# Patient Record
Sex: MALE | Race: WHITE | NOT HISPANIC OR LATINO | Employment: OTHER | URBAN - NONMETROPOLITAN AREA
[De-identification: names, ages, dates, MRNs, and addresses within clinical notes are randomized per-mention and may not be internally consistent; named-entity substitution may affect disease eponyms.]

---

## 2024-07-12 ENCOUNTER — TELEPHONE (OUTPATIENT)
Dept: CARDIOLOGY | Facility: CLINIC | Age: 64
End: 2024-07-12

## 2024-07-12 DIAGNOSIS — Z45.02 IMPLANTABLE DEFIBRILLATOR REPROGRAMMING/CHECK: Primary | ICD-10-CM

## 2024-07-12 PROCEDURE — 93306 TTE W/DOPPLER COMPLETE: CPT | Performed by: INTERNAL MEDICINE

## 2024-07-12 NOTE — TELEPHONE ENCOUNTER
Received ER phone call from [facility] on Eber for COMPLETE HEART BLOCK. Per Dr Zambrano please set patient up for in patient pacemaker implant Monday 7/15/2024 or even maybe this weekend 7/13/2024 or 7/14/2024. Routine follow up and pacemaker orders.

## 2024-07-15 ENCOUNTER — TELEPHONE (OUTPATIENT)
Dept: CARDIOLOGY | Facility: CLINIC | Age: 64
End: 2024-07-15
Payer: MEDICARE

## 2024-07-15 NOTE — TELEPHONE ENCOUNTER
Message recvd from Dr. Balaji Zambrano MD   Patient needs one week wound check, 15 week Follow up, pacemaker referral.     Orders previously entered by nurse clinician twyla 07/12/2024 telephone encounter.

## 2024-07-19 ENCOUNTER — CLINICAL SUPPORT (OUTPATIENT)
Dept: CARDIOLOGY | Facility: CLINIC | Age: 64
End: 2024-07-19
Payer: MEDICARE

## 2024-07-19 VITALS
DIASTOLIC BLOOD PRESSURE: 88 MMHG | BODY MASS INDEX: 41.62 KG/M2 | SYSTOLIC BLOOD PRESSURE: 136 MMHG | HEIGHT: 73 IN | HEART RATE: 68 BPM | WEIGHT: 314 LBS

## 2024-07-19 DIAGNOSIS — Z45.02 IMPLANTABLE DEFIBRILLATOR REPROGRAMMING/CHECK: ICD-10-CM

## 2024-07-19 DIAGNOSIS — I44.2 COMPLETE HEART BLOCK (MULTI): ICD-10-CM

## 2024-07-19 PROCEDURE — 99024 POSTOP FOLLOW-UP VISIT: CPT | Performed by: INTERNAL MEDICINE

## 2024-07-19 RX ORDER — METOPROLOL TARTRATE AND HYDROCHLOROTHIAZIDE 100; 50 MG/1; MG/1
0.5 TABLET ORAL DAILY
COMMUNITY

## 2024-07-19 RX ORDER — LOSARTAN POTASSIUM 100 MG/1
100 TABLET ORAL DAILY
COMMUNITY

## 2024-07-19 RX ORDER — FERROUS SULFATE, DRIED 160(50) MG
1 TABLET, EXTENDED RELEASE ORAL DAILY
COMMUNITY

## 2024-07-19 RX ORDER — AMLODIPINE BESYLATE 10 MG/1
10 TABLET ORAL DAILY
COMMUNITY

## 2024-07-19 RX ORDER — LANOLIN ALCOHOL/MO/W.PET/CERES
1000 CREAM (GRAM) TOPICAL DAILY
COMMUNITY

## 2024-07-19 RX ORDER — BISMUTH SUBSALICYLATE 262 MG
1 TABLET,CHEWABLE ORAL DAILY
COMMUNITY

## 2024-07-19 NOTE — PROGRESS NOTES
"Patient is here for a Wound check ordered by Dr. Zambrano status post pacemaker insertion. Dr. Jackson is in suite. Patient had first pacemaker implant inserted on 7/13 completed by Dr. Balaji Zambrano MD due to diagnosis of complete heart block. Insertion site was free of any signs of infection or drainage. Course of antibiotics completed. Medication list was Updated verbally with patient in office. Denies cardiac complaints at this time.       To Dr. Vianney Aldana MD for review.       Vitals:    07/19/24 1211   BP: 136/88   BP Location: Right arm   Patient Position: Sitting   Pulse: 68   Temp: 36.8 °C (98.2 °F)   Weight: 142 kg (314 lb)   Height: 1.854 m (6' 1\")         "

## 2024-07-22 VITALS
HEART RATE: 68 BPM | SYSTOLIC BLOOD PRESSURE: 136 MMHG | DIASTOLIC BLOOD PRESSURE: 88 MMHG | TEMPERATURE: 98.2 F | HEIGHT: 73 IN | BODY MASS INDEX: 41.62 KG/M2 | WEIGHT: 314 LBS

## 2024-11-15 ENCOUNTER — APPOINTMENT (OUTPATIENT)
Dept: CARDIOLOGY | Facility: CLINIC | Age: 64
End: 2024-11-15
Payer: MEDICARE

## 2024-11-15 VITALS
DIASTOLIC BLOOD PRESSURE: 88 MMHG | SYSTOLIC BLOOD PRESSURE: 132 MMHG | HEART RATE: 72 BPM | HEIGHT: 74 IN | BODY MASS INDEX: 40.43 KG/M2 | WEIGHT: 315 LBS

## 2024-11-15 DIAGNOSIS — I10 PRIMARY HYPERTENSION: ICD-10-CM

## 2024-11-15 DIAGNOSIS — E66.01 MORBID OBESITY (MULTI): ICD-10-CM

## 2024-11-15 DIAGNOSIS — Z95.0 PACEMAKER: ICD-10-CM

## 2024-11-15 DIAGNOSIS — R00.1 BRADYCARDIA: ICD-10-CM

## 2024-11-15 DIAGNOSIS — Z45.018 PACEMAKER REPROGRAMMING/CHECK: ICD-10-CM

## 2024-11-15 DIAGNOSIS — E78.2 MIXED HYPERLIPIDEMIA: ICD-10-CM

## 2024-11-15 DIAGNOSIS — I44.2 COMPLETE ATRIOVENTRICULAR BLOCK (MULTI): Primary | ICD-10-CM

## 2024-11-15 PROBLEM — Z95.810 AICD (AUTOMATIC CARDIOVERTER/DEFIBRILLATOR) PRESENT: Status: ACTIVE | Noted: 2024-11-15

## 2024-11-15 PROCEDURE — 99214 OFFICE O/P EST MOD 30 MIN: CPT | Performed by: INTERNAL MEDICINE

## 2024-11-15 PROCEDURE — 3008F BODY MASS INDEX DOCD: CPT | Performed by: INTERNAL MEDICINE

## 2024-11-15 PROCEDURE — 3079F DIAST BP 80-89 MM HG: CPT | Performed by: INTERNAL MEDICINE

## 2024-11-15 PROCEDURE — 1159F MED LIST DOCD IN RCRD: CPT | Performed by: INTERNAL MEDICINE

## 2024-11-15 PROCEDURE — 3075F SYST BP GE 130 - 139MM HG: CPT | Performed by: INTERNAL MEDICINE

## 2024-11-15 PROCEDURE — 93000 ELECTROCARDIOGRAM COMPLETE: CPT | Performed by: INTERNAL MEDICINE

## 2024-11-15 PROCEDURE — 1036F TOBACCO NON-USER: CPT | Performed by: INTERNAL MEDICINE

## 2024-11-15 RX ORDER — INDAPAMIDE 2.5 MG/1
2.5 TABLET ORAL EVERY MORNING
Qty: 90 TABLET | Refills: 3 | Status: SHIPPED | OUTPATIENT
Start: 2024-11-15 | End: 2025-11-15

## 2024-11-15 NOTE — LETTER
November 15, 2024     Kris Gorman,   [address] [place] [address]    Patient: Eber Herrera   YOB: 1960   Date of Visit: 11/15/2024       Dear Dr. Kris Gorman, DO:    Thank you for referring Eber Herrera to me for evaluation. Below are my notes for this consultation.  If you have questions, please do not hesitate to call me. I look forward to following your patient along with you.       Sincerely,     Vianney Aldana MD      CC: No Recipients  ______________________________________________________________________________________    Subjective   Eber Herrera is a 64 y.o. male            HPI   64-year-old white male who this past July presented to [facility] with complete heart block and was seen by Dr. Zambrano.  He had permanent AV sequential pacemaker with no complications.  He has not follow-up yet with the pacemaker clinic.  His other medical problems include only hypertension and morbid obesity.  He has no history of underlying coronary heart disease or heart failure.  Has no valvular heart disease.  There is no family history of heart block requiring pacemakers.  He lives with his wife and maintains relatively active lifestyle and his snf.  He has been compliant with medical therapy.  EKG today revealed he is 100% pacing the right ventricle.  He is currently on beta-blocker as well.  His pressure is under control, his cardiac and pulmonary examinations were normal.    Assessment/recommendations:    1-complete heart block status post AV sequential pacemaker implantation July 2024 at [facility] by Dr. Zambrano.  EKG today demonstrated AV sequential pacing.  The patient will be scheduled follow-up in the pacemaker clinic.  I advised patient to avoid beta-blocker therapy see if he become somewhat independent of the pacemaker at least partially.  2-essential hypertension, currently  "under control, will try to avoid beta-blocker therapy and allow more independence from the pacemaker.  He will be starting indapamide 2.5 mg daily to replace the combination of metoprolol and hydrochlorothiazide.  This metabolic profile in couple week was ordered  3-morbid obesity, lifestyle modification to lose weight was advocated.  Review of Systems   All other systems reviewed and are negative.           Vitals:    11/15/24 1459   BP: 132/88   BP Location: Right arm   Patient Position: Sitting   Pulse: 72   Weight: 144 kg (318 lb)   Height: 1.88 m (6' 2\")      EKG done in office today   Objective   Physical Exam  Constitutional:       Appearance: Normal appearance.   HENT:      Nose: Nose normal.   Neck:      Vascular: No carotid bruit.   Cardiovascular:      Rate and Rhythm: Normal rate.      Pulses: Normal pulses.      Heart sounds: Normal heart sounds.   Pulmonary:      Effort: Pulmonary effort is normal.   Abdominal:      General: Bowel sounds are normal.      Palpations: Abdomen is soft.   Musculoskeletal:         General: Normal range of motion.      Cervical back: Normal range of motion.      Right lower leg: No edema.      Left lower leg: No edema.   Skin:     General: Skin is warm and dry.   Neurological:      General: No focal deficit present.      Mental Status: He is alert.   Psychiatric:         Mood and Affect: Mood normal.         Behavior: Behavior normal.         Thought Content: Thought content normal.         Judgment: Judgment normal.         Allergies  Prednisone     Current Medications    Current Outpatient Medications:   •  amLODIPine (Norvasc) 10 mg tablet, Take 1 tablet (10 mg) by mouth once daily., Disp: , Rfl:   •  calcium carbonate-vitamin D3 500 mg-5 mcg (200 unit) tablet, Take 1 tablet by mouth once daily., Disp: , Rfl:   •  cyanocobalamin (Vitamin B-12) 1,000 mcg tablet, Take 1 tablet (1,000 mcg) by mouth once daily., Disp: , Rfl:   •  losartan (Cozaar) 100 mg tablet, Take 1 tablet " (100 mg) by mouth once daily., Disp: , Rfl:   •  metoprolol ta-hydrochlorothiaz (Lopressor HCT) 100-50 mg tablet, Take 0.5 tablets by mouth once daily., Disp: , Rfl:   •  multivitamin tablet, Take 1 tablet by mouth once daily., Disp: , Rfl:                      Assessment/Plan   1. Implantable defibrillator reprogramming/check  Follow Up In Cardiology      2. Complete atrioventricular block (Multi)        3. Bradycardia        4. Pacemaker        5. Primary hypertension        6. Mixed hyperlipidemia        7. BMI 40.0-44.9, adult (Multi)                 Scribe Attestation  By signing my name below, I, Nhi PATEL LPN Scribe   attest that this documentation has been prepared under the direction and in the presence of Vianney Aldana MD.     Provider Attestation - Scribe documentation    All medical record entries made by the Scribe were at my direction and personally dictated by me. I have reviewed the chart and agree that the record accurately reflects my personal performance of the history, physical exam, discussion and plan.

## 2024-11-15 NOTE — PATIENT INSTRUCTIONS
Please bring all medicines, vitamins, and herbal supplements with you when you come to the office.    Prescriptions will not be filled unless you are compliant with your follow up appointments or have a follow up appointment scheduled as per instruction of your physician. Refills should be requested at the time of your visit.     Pacemaker/Defibrillator follow up per routine     BMI was above normal measurement. Current weight: 144 kg (318 lb)  Weight change since last visit (-) denotes wt loss 4 lbs   Weight loss needed to achieve BMI 25: 123.7 Lbs  Weight loss needed to achieve BMI 30: 84.8 Lbs  Provided instructions on dietary changes  Provided instructions on exercise.

## 2024-11-15 NOTE — PROGRESS NOTES
"Subjective   Eber Herrera is a 64 y.o. male            HPI   64-year-old white male who this past July presented to [facility] with complete heart block and was seen by Dr. Zambrano.  He had permanent AV sequential pacemaker with no complications.  He has not follow-up yet with the pacemaker clinic.  His other medical problems include only hypertension and morbid obesity.  He has no history of underlying coronary heart disease or heart failure.  Has no valvular heart disease.  There is no family history of heart block requiring pacemakers.  He lives with his wife and maintains relatively active lifestyle and his senior living.  He has been compliant with medical therapy.  EKG today revealed he is 100% pacing the right ventricle.  He is currently on beta-blocker as well.  His pressure is under control, his cardiac and pulmonary examinations were normal.    Assessment/recommendations:    1-complete heart block status post AV sequential pacemaker implantation July 2024 at [facility] by Dr. Zambrano.  EKG today demonstrated AV sequential pacing.  The patient will be scheduled follow-up in the pacemaker clinic.  I advised patient to avoid beta-blocker therapy see if he become somewhat independent of the pacemaker at least partially.  2-essential hypertension, currently under control, will try to avoid beta-blocker therapy and allow more independence from the pacemaker.  He will be starting indapamide 2.5 mg daily to replace the combination of metoprolol and hydrochlorothiazide.  This metabolic profile in couple week was ordered  3-morbid obesity, lifestyle modification to lose weight was advocated.  Review of Systems   All other systems reviewed and are negative.           Vitals:    11/15/24 1459   BP: 132/88   BP Location: Right arm   Patient Position: Sitting   Pulse: 72   Weight: 144 kg (318 lb)   Height: 1.88 m (6' 2\")      EKG done in office today   Objective   Physical " Exam  Constitutional:       Appearance: Normal appearance.   HENT:      Nose: Nose normal.   Neck:      Vascular: No carotid bruit.   Cardiovascular:      Rate and Rhythm: Normal rate.      Pulses: Normal pulses.      Heart sounds: Normal heart sounds.   Pulmonary:      Effort: Pulmonary effort is normal.   Abdominal:      General: Bowel sounds are normal.      Palpations: Abdomen is soft.   Musculoskeletal:         General: Normal range of motion.      Cervical back: Normal range of motion.      Right lower leg: No edema.      Left lower leg: No edema.   Skin:     General: Skin is warm and dry.   Neurological:      General: No focal deficit present.      Mental Status: He is alert.   Psychiatric:         Mood and Affect: Mood normal.         Behavior: Behavior normal.         Thought Content: Thought content normal.         Judgment: Judgment normal.         Allergies  Prednisone     Current Medications    Current Outpatient Medications:     amLODIPine (Norvasc) 10 mg tablet, Take 1 tablet (10 mg) by mouth once daily., Disp: , Rfl:     calcium carbonate-vitamin D3 500 mg-5 mcg (200 unit) tablet, Take 1 tablet by mouth once daily., Disp: , Rfl:     cyanocobalamin (Vitamin B-12) 1,000 mcg tablet, Take 1 tablet (1,000 mcg) by mouth once daily., Disp: , Rfl:     losartan (Cozaar) 100 mg tablet, Take 1 tablet (100 mg) by mouth once daily., Disp: , Rfl:     metoprolol ta-hydrochlorothiaz (Lopressor HCT) 100-50 mg tablet, Take 0.5 tablets by mouth once daily., Disp: , Rfl:     multivitamin tablet, Take 1 tablet by mouth once daily., Disp: , Rfl:                      Assessment/Plan   1. Implantable defibrillator reprogramming/check  Follow Up In Cardiology      2. Complete atrioventricular block (Multi)        3. Bradycardia        4. Pacemaker        5. Primary hypertension        6. Mixed hyperlipidemia        7. BMI 40.0-44.9, adult (Multi)                 Scribe Attestation  By signing my name below, Nhi CHEN LPN   , Scribe   attest that this documentation has been prepared under the direction and in the presence of Vianney Aldana MD.     Provider Attestation - Scribe documentation    All medical record entries made by the Scribe were at my direction and personally dictated by me. I have reviewed the chart and agree that the record accurately reflects my personal performance of the history, physical exam, discussion and plan.

## 2025-06-02 LAB
NON-UH HIE ANION GAP: 11.4 MEQ/L (ref 6–15)
NON-UH HIE BLOOD UREA NITROGEN: 20 MG/DL (ref 7–25)
NON-UH HIE CALCIUM: 9.2 MG/DL (ref 8.6–10.3)
NON-UH HIE CARBON DIOXIDE: 30.4 MMOL/L (ref 21–31)
NON-UH HIE CHLORIDE: 105 MMOL/L (ref 98–107)
NON-UH HIE CREATININE: 0.75 MG/DL (ref 0.7–1.3)
NON-UH HIE ESTIMATED GFR: >60 ML/MIN
NON-UH HIE GLUCOSE: 94 MG/DL (ref 70–100)
NON-UH HIE POTASSIUM: 3.8 MMOL/L (ref 3.5–5.1)
NON-UH HIE SODIUM: 143 MMOL/L (ref 136–145)

## 2025-06-03 ENCOUNTER — TELEPHONE (OUTPATIENT)
Dept: CARDIOLOGY | Facility: CLINIC | Age: 65
End: 2025-06-03
Payer: MEDICARE

## 2025-06-03 NOTE — TELEPHONE ENCOUNTER
----- Message from Vianney Aldana sent at 6/2/2025 11:06 PM EDT -----  No new orders. Please call patient with result. Within normal limits.   ----- Message -----  From: Gael Snow - Lab Results In  Sent: 6/2/2025  11:47 AM EDT  To: Vianney Aldana MD

## 2025-06-03 NOTE — TELEPHONE ENCOUNTER
Result Communication    Resulted Orders   [facility] Basic Metabolic Panel   Result Value Ref Range    [facility] Glucose 94 70 - 100 mg/dL      Comment:         Random Glucose Reference Range is dependent on time and   content of last meal. Glucose of more than 200 mg/dL in a   nonstressed, ambulatory subject supports the diagnosis of   Diabetes Mellitus.   ADA recommended reference range    [facility] Blood Urea Nitrogen 20 7 - 25 mg/dL    [facility] Creatinine 0.75 0.70 - 1.30 mg/dL    [facility] ESTIMATED GFR >60.0 mL/Min    [facility] Sodium 143 136 - 145 mmol/L    [facility] Potassium 3.8 3.5 - 5.1 mmol/L    [facility] Chloride 105 98 - 107 mmol/L    [facility] Carbon Dioxide 30.4 21.0 - 31.0 mmol/L    [facility] Anion Gap 11.4 6.0 - 15.0 meq/L    [facility] Calcium 9.2 8.6 - 10.3 mg/dL      Comment:      PERFORMED BY:[facility], [place] [id number] MEDICAL DIRECTORDIRK SCHROEDER M.D.       8:27 AM      Results were successfully communicated with the patient and they acknowledged their understanding.

## 2025-07-02 ENCOUNTER — APPOINTMENT (OUTPATIENT)
Dept: CARDIOLOGY | Facility: CLINIC | Age: 65
End: 2025-07-02
Payer: MEDICARE

## 2025-07-02 VITALS
WEIGHT: 314 LBS | HEART RATE: 74 BPM | BODY MASS INDEX: 40.3 KG/M2 | SYSTOLIC BLOOD PRESSURE: 128 MMHG | HEIGHT: 74 IN | DIASTOLIC BLOOD PRESSURE: 72 MMHG

## 2025-07-02 DIAGNOSIS — E66.01 MORBID OBESITY (MULTI): ICD-10-CM

## 2025-07-02 DIAGNOSIS — I51.7 ATRIAL DILATATION, LEFT: ICD-10-CM

## 2025-07-02 DIAGNOSIS — Z78.9 NEVER SMOKED TOBACCO: ICD-10-CM

## 2025-07-02 DIAGNOSIS — I44.2 COMPLETE ATRIOVENTRICULAR BLOCK (MULTI): Primary | ICD-10-CM

## 2025-07-02 DIAGNOSIS — R94.31 ABNORMAL ELECTROCARDIOGRAM (ECG) (EKG): ICD-10-CM

## 2025-07-02 DIAGNOSIS — I10 PRIMARY HYPERTENSION: ICD-10-CM

## 2025-07-02 DIAGNOSIS — I51.7 LEFT VENTRICULAR DILATATION: ICD-10-CM

## 2025-07-02 DIAGNOSIS — R00.1 BRADYCARDIA: ICD-10-CM

## 2025-07-02 DIAGNOSIS — R06.83 SNORING: ICD-10-CM

## 2025-07-02 DIAGNOSIS — Z95.0 PACEMAKER: ICD-10-CM

## 2025-07-02 PROCEDURE — 1036F TOBACCO NON-USER: CPT | Performed by: INTERNAL MEDICINE

## 2025-07-02 PROCEDURE — 3074F SYST BP LT 130 MM HG: CPT | Performed by: INTERNAL MEDICINE

## 2025-07-02 PROCEDURE — 1159F MED LIST DOCD IN RCRD: CPT | Performed by: INTERNAL MEDICINE

## 2025-07-02 PROCEDURE — 3078F DIAST BP <80 MM HG: CPT | Performed by: INTERNAL MEDICINE

## 2025-07-02 PROCEDURE — 99214 OFFICE O/P EST MOD 30 MIN: CPT | Performed by: INTERNAL MEDICINE

## 2025-07-02 PROCEDURE — 3008F BODY MASS INDEX DOCD: CPT | Performed by: INTERNAL MEDICINE

## 2025-07-02 NOTE — PATIENT INSTRUCTIONS
Please bring all medicines, vitamins, and herbal supplements with you when you come to the office.    Prescriptions will not be filled unless you are compliant with your follow up appointments or have a follow up appointment scheduled as per instruction of your physician. Refills should be requested at the time of your visit.     BMI was above normal measurement. Current weight: 142 kg (314 lb)  Weight change since last visit (-) denotes wt loss -4 lbs   Weight loss needed to achieve BMI 25: 119.7 Lbs  Weight loss needed to achieve BMI 30: 80.8 Lbs  Provided instructions on dietary changes  Provided instructions on exercise.    Pacemaker/Defibrillator follow up per routine   Echo [facility]  Pacemaker clinic to adjust rate to 50 bpm  Sleep study  Follow up 6 months

## 2025-07-02 NOTE — PROGRESS NOTES
HPI  Patient is in the office for follow-up for complete heart block and a pacemaker insertion last year.  He does have hypertension on medical therapy along with morbid obesity and suspected sleep apnea.  Since his last visit he has had no events whatsoever.  His weight is down 4 pounds.  His wife indicated that he snores heavily at night and his body habitus is highly suggestive of obstructive sleep apnea for which he is agreeable to go for sleep study.  His pacemaker analysis revealed 99% ventricular pacing.  It also demonstrated that his underlying rhythm is sinus at rate 55 bpm.  His cardiac and pulmonary examinations were normal.  His lab data from recent testing regarding base metabolic profile were on target and this was reviewed with him.  His cardiac and pulmonary examinations were normal.    Assessment/recommendations:     1-complete heart block status post AV sequential pacemaker implantation July 2024 at [facility] by Dr. Zambrano.  Pacemaker analysis revealed 99% ventricular pacing.  The underlying rhythm is sinus at 55 bpm.  Will reduce the pacing rate down to 50 bpm hoping for maintaining sinus rhythm mechanism.  If this is tolerated it will save battery.  This was explained to the patient.  Due to 99% ventricular pacing echocardiogram will be scheduled to reassess ejection fraction and LV size which was previously noted to be increased  2-essential hypertension, currently under control, utilizing losartan, amlodipine and indapamide which have been well-tolerated.  He is suspected to have sleep apnea and he follows low-salt diet.  His weight remains in the morbid obesity range and weight loss was strongly recommended.  3-morbid obesity, lifestyle modification to lose weight was advocated.  Patient lost 4 pounds from last visit, encouragement for more weight loss was provided following low-calorie diet and exercise.  4-suspected obstructive sleep apnea, the patient will be  "scheduled for sleep study and we will go from there.  More weight loss was recommended.  5-dilated left ventricle and dilated left atrium based on the echocardiogram July 2024.  Follow-up echocardiogram is scheduled    ROS     Review of system essentially normal    Vitals:    07/02/25 0837   BP: 128/72   BP Location: Right arm   Patient Position: Sitting   Pulse: 74   Weight: 142 kg (314 lb)   Height: 1.88 m (6' 2\")        Objective   Physical Exam  Constitutional:       Appearance: Normal appearance.   HENT:      Nose: Nose normal.   Neck:      Vascular: No carotid bruit.   Cardiovascular:      Rate and Rhythm: Normal rate.      Pulses: Normal pulses.      Heart sounds: Normal heart sounds.   Pulmonary:      Effort: Pulmonary effort is normal.   Abdominal:      General: Bowel sounds are normal.      Palpations: Abdomen is soft.   Musculoskeletal:         General: Normal range of motion.      Cervical back: Normal range of motion.      Right lower leg: No edema.      Left lower leg: No edema.   Skin:     General: Skin is warm and dry.   Neurological:      General: No focal deficit present.      Mental Status: He is alert.   Psychiatric:         Mood and Affect: Mood normal.         Behavior: Behavior normal.         Thought Content: Thought content normal.         Judgment: Judgment normal.         Allergies  Prednisone     Current Medications  Current Outpatient Medications   Medication Instructions    amLODIPine (NORVASC) 10 mg, Daily    calcium carbonate-vitamin D3 500 mg-5 mcg (200 unit) tablet 1 tablet, Daily    cyanocobalamin (VITAMIN B-12) 1,000 mcg, Daily    indapamide (LOZOL) 2.5 mg, oral, Every morning    losartan (COZAAR) 100 mg, Daily    multivitamin tablet 1 tablet, Daily                        Assessment/Plan   1. Complete atrioventricular block (Multi)  Follow Up In Cardiology    Referral to Pacemaker Clinic and Follow Up      2. Bradycardia  Follow Up In Cardiology    Referral to Pacemaker Clinic and " Follow Up      3. Pacemaker  Referral to Pacemaker Clinic and Follow Up      4. Abnormal electrocardiogram (ECG) (EKG)  Transthoracic Echo Complete      5. Primary hypertension  In-Center Sleep Study      6. Snoring  Follow Up In Cardiology    In-Center Sleep Study      7. BMI 40.0-44.9, adult (Multi)  Follow Up In Cardiology    In-Center Sleep Study      8. Never smoked tobacco        9. Morbid obesity (Multi)        10. Left ventricular dilatation        11. Atrial dilatation, left                 Scribe Attestation  By signing my name below, IBrook LPN, Scribe   attest that this documentation has been prepared under the direction and in the presence of Vianney Aldana MD.     Provider Attestation - Scribe documentation    All medical record entries made by the Scribe were at my direction and personally dictated by me. I have reviewed the chart and agree that the record accurately reflects my personal performance of the history, physical exam, discussion and plan.

## 2025-07-02 NOTE — LETTER
July 2, 2025     Kris Gorman DO  [address] [place] [address]    Patient: Eber Herrera   YOB: 1960   Date of Visit: 7/2/2025       Dear Dr. Kris Gorman DO:    Thank you for referring Eber Herrera to me for evaluation. Below are my notes for this consultation.  If you have questions, please do not hesitate to call me. I look forward to following your patient along with you.       Sincerely,     Vianney Aldana MD      CC: No Recipients  ______________________________________________________________________________________    HPI  Patient is in the office for follow-up for complete heart block and a pacemaker insertion last year.  He does have hypertension on medical therapy along with morbid obesity and suspected sleep apnea.  Since his last visit he has had no events whatsoever.  His weight is down 4 pounds.  His wife indicated that he snores heavily at night and his body habitus is highly suggestive of obstructive sleep apnea for which he is agreeable to go for sleep study.  His pacemaker analysis revealed 99% ventricular pacing.  It also demonstrated that his underlying rhythm is sinus at rate 55 bpm.  His cardiac and pulmonary examinations were normal.  His lab data from recent testing regarding base metabolic profile were on target and this was reviewed with him.  His cardiac and pulmonary examinations were normal.    Assessment/recommendations:     1-complete heart block status post AV sequential pacemaker implantation July 2024 at [facility] by Dr. Zambrano.  Pacemaker analysis revealed 99% ventricular pacing.  The underlying rhythm is sinus at 55 bpm.  Will reduce the pacing rate down to 50 bpm hoping for maintaining sinus rhythm mechanism.  If this is tolerated it will save battery.  This was explained to the patient.  Due to 99% ventricular pacing echocardiogram will be scheduled to reassess ejection fraction and LV  "size which was previously noted to be increased  2-essential hypertension, currently under control, utilizing losartan, amlodipine and indapamide which have been well-tolerated.  He is suspected to have sleep apnea and he follows low-salt diet.  His weight remains in the morbid obesity range and weight loss was strongly recommended.  3-morbid obesity, lifestyle modification to lose weight was advocated.  Patient lost 4 pounds from last visit, encouragement for more weight loss was provided following low-calorie diet and exercise.  4-suspected obstructive sleep apnea, the patient will be scheduled for sleep study and we will go from there.  More weight loss was recommended.  5-dilated left ventricle and dilated left atrium based on the echocardiogram July 2024.  Follow-up echocardiogram is scheduled    ROS     Review of system essentially normal    Vitals:    07/02/25 0837   BP: 128/72   BP Location: Right arm   Patient Position: Sitting   Pulse: 74   Weight: 142 kg (314 lb)   Height: 1.88 m (6' 2\")        Objective   Physical Exam  Constitutional:       Appearance: Normal appearance.   HENT:      Nose: Nose normal.   Neck:      Vascular: No carotid bruit.   Cardiovascular:      Rate and Rhythm: Normal rate.      Pulses: Normal pulses.      Heart sounds: Normal heart sounds.   Pulmonary:      Effort: Pulmonary effort is normal.   Abdominal:      General: Bowel sounds are normal.      Palpations: Abdomen is soft.   Musculoskeletal:         General: Normal range of motion.      Cervical back: Normal range of motion.      Right lower leg: No edema.      Left lower leg: No edema.   Skin:     General: Skin is warm and dry.   Neurological:      General: No focal deficit present.      Mental Status: He is alert.   Psychiatric:         Mood and Affect: Mood normal.         Behavior: Behavior normal.         Thought Content: Thought content normal.         Judgment: Judgment normal.         Allergies  Prednisone     Current " Medications  Current Outpatient Medications   Medication Instructions   • amLODIPine (NORVASC) 10 mg, Daily   • calcium carbonate-vitamin D3 500 mg-5 mcg (200 unit) tablet 1 tablet, Daily   • cyanocobalamin (VITAMIN B-12) 1,000 mcg, Daily   • indapamide (LOZOL) 2.5 mg, oral, Every morning   • losartan (COZAAR) 100 mg, Daily   • multivitamin tablet 1 tablet, Daily                        Assessment/Plan   1. Complete atrioventricular block (Multi)  Follow Up In Cardiology    Referral to Pacemaker Clinic and Follow Up      2. Bradycardia  Follow Up In Cardiology    Referral to Pacemaker Clinic and Follow Up      3. Pacemaker  Referral to Pacemaker Clinic and Follow Up      4. Abnormal electrocardiogram (ECG) (EKG)  Transthoracic Echo Complete      5. Primary hypertension  In-Center Sleep Study      6. Snoring  Follow Up In Cardiology    In-Center Sleep Study      7. BMI 40.0-44.9, adult (Multi)  Follow Up In Cardiology    In-Center Sleep Study      8. Never smoked tobacco        9. Morbid obesity (Multi)        10. Left ventricular dilatation        11. Atrial dilatation, left                 Scribe Attestation  By signing my name below, IBrook LPN, Scribe   attest that this documentation has been prepared under the direction and in the presence of Vianney Aldana MD.     Provider Attestation - Scribe documentation    All medical record entries made by the Scribe were at my direction and personally dictated by me. I have reviewed the chart and agree that the record accurately reflects my personal performance of the history, physical exam, discussion and plan.

## 2026-02-12 ENCOUNTER — APPOINTMENT (OUTPATIENT)
Dept: CARDIOLOGY | Facility: CLINIC | Age: 66
End: 2026-02-12
Payer: MEDICARE